# Patient Record
(demographics unavailable — no encounter records)

---

## 2025-07-28 NOTE — PLAN
0 [FreeTextEntry1] : Sono discussed with pt, intrauterine pregnancy w/ + FHR present. Pt educated on importance of taking a PNV. NP counseled pt on tx for nausea such as trying fina supplements and vitamin B6 50mg BID.  pregnancy precautions reviewed and all questions answered. Pt to f/u in 4 weeks for new ob and sono or prn.

## 2025-07-28 NOTE — PROCEDURE
[Transvaginal OB Sonogram] : Transvaginal OB Sonogram [Intrauterine Pregnancy] : intrauterine pregnancy [Yolk Sac] : yolk sac present [Fetal Heart] : fetal heart present [Date: ___] : Date: [unfilled] [Current GA by Sonogram: ___ (wks)] : Current GA by Sonogram: [unfilled]Uwks [___ day(s)] : [unfilled] days [Transvaginal OB Sonogram WNL] : Transvaginal OB Sonogram WNL [FreeTextEntry1] : sono performed due to missed period and positive UPT.

## 2025-07-28 NOTE — HISTORY OF PRESENT ILLNESS
[Patient reported PAP Smear was normal] : Patient reported PAP Smear was normal [Y] : Patient reports abnormal menses [Menarche Age ____] : age at menarche was [unfilled] [Normal Amount/Duration] : it was of a normal amount and duration [Regular Cycle Intervals] : have been regular [Regular Duration] : has been regular [FreeTextEntry1] : pt, , presents today w/ c/o missed period, unknown LMP, and nausea. pt states she d/c birth control in may and never had a menses after that.  [PapSmeardate] : 1/2024 [PGxTotal] : 1 [PGHxFullTerm] : 0 [PGHxPremature] : 0 [PGHxAbortions] : 0 [[facility]] : 0

## 2025-07-28 NOTE — PHYSICAL EXAM
[Chaperoned Physical Exam] : A chaperone was present in the examining room during all aspects of the physical examination. [MA] : MA [Appropriately responsive] : appropriately responsive [Alert] : alert [No Acute Distress] : no acute distress [No Lymphadenopathy] : no lymphadenopathy [Regular Rate Rhythm] : regular rate rhythm [No Murmurs] : no murmurs [Clear to Auscultation B/L] : clear to auscultation bilaterally [Soft] : soft [Non-tender] : non-tender [Non-distended] : non-distended [No HSM] : No HSM [No Lesions] : no lesions [No Mass] : no mass [Oriented x3] : oriented x3 [Labia Majora] : normal [Labia Minora] : normal [Normal] : normal [FreeTextEntry2] : jose